# Patient Record
Sex: MALE | Race: WHITE | NOT HISPANIC OR LATINO | Employment: STUDENT | URBAN - METROPOLITAN AREA
[De-identification: names, ages, dates, MRNs, and addresses within clinical notes are randomized per-mention and may not be internally consistent; named-entity substitution may affect disease eponyms.]

---

## 2023-11-24 ENCOUNTER — OFFICE VISIT (OUTPATIENT)
Dept: URGENT CARE | Facility: CLINIC | Age: 23
End: 2023-11-24
Payer: COMMERCIAL

## 2023-11-24 VITALS
SYSTOLIC BLOOD PRESSURE: 152 MMHG | HEART RATE: 58 BPM | OXYGEN SATURATION: 98 % | BODY MASS INDEX: 28 KG/M2 | WEIGHT: 200 LBS | RESPIRATION RATE: 14 BRPM | DIASTOLIC BLOOD PRESSURE: 97 MMHG | HEIGHT: 71 IN | TEMPERATURE: 97.9 F

## 2023-11-24 DIAGNOSIS — H69.91 DISORDER OF RIGHT EUSTACHIAN TUBE: Primary | ICD-10-CM

## 2023-11-24 PROCEDURE — 99203 OFFICE O/P NEW LOW 30 MIN: CPT | Performed by: FAMILY MEDICINE

## 2023-11-24 NOTE — PROGRESS NOTES
[facility]        NAME: Valentino Goldstein is a 26 y.o. male  : 2000    MRN: [id number]  DATE: 2023  TIME: 2:06 PM    Assessment and Plan   Disorder of right eustachian tube [H69.91]  1. Disorder of right eustachian tube              Patient Instructions     Afrin twice a day for 2 to 3 days, then stop. Eustachian tube/ear massage as demonstrated, 2 to 3 minutes every 2-3 hours. Follow up with PCP in 3-5 days. Proceed to  ER if symptoms worsen. Chief Complaint     Chief Complaint   Patient presents with    Ear Fullness     Right ear blockage and fullness starting yesterday         History of Present Illness       Ear Fullness (Right ear blockage and fullness starting yesterday). He has tried some type of eardrops. Decongestants, and saline nasal spray, no relief. Ear Fullness         Review of Systems   Review of Systems   Constitutional: Negative. HENT:  Positive for ear pain. Respiratory: Negative. Cardiovascular: Negative. Current Medications     No current outpatient medications on file. Current Allergies     Allergies as of 2023 - Reviewed 2023   Allergen Reaction Noted    Amoxicillin Rash 2005            The following portions of the patient's history were reviewed and updated as appropriate: allergies, current medications, past family history, past medical history, past social history, past surgical history and problem list.     History reviewed. No pertinent past medical history. Past Surgical History:   Procedure Laterality Date    TYMPANOSTOMY TUBE PLACEMENT         Family History   Problem Relation Age of Onset    No Known Problems Mother     No Known Problems Father          Medications have been verified. Objective   /97   Pulse 58   Temp 97.9 °F (36.6 °C)   Resp 14   Ht 5' 11" (1.803 m)   Wt 90.7 kg (200 lb)   SpO2 98%   BMI 27.89 kg/m²   No LMP for male patient.        Physical Exam     Physical Exam  HENT:      Right Ear: Tympanic membrane is retracted.       Left Ear: Tympanic membrane normal.

## 2023-11-24 NOTE — PATIENT INSTRUCTIONS
Afrin twice a day for 2 to 3 days, then stop. Eustachian tube/ear massage as demonstrated, 2 to 3 minutes every 2-3 hours. Follow up with PCP in 3-5 days. Proceed to  ER if symptoms worsen. Eustachian Tube Dysfunction   WHAT YOU NEED TO KNOW:   What is eustachian tube dysfunction (ETD)? ETD is a condition that prevents your eustachian tubes from opening properly. It can also cause them to become blocked. Eustachian tubes connect your middle ear to the back of your nose and throat. These tubes open and allow air to flow in and out when you sneeze, swallow, or yawn. What causes or increases my risk for ETD? ETD may be caused by swelling or buildup of mucus in your eustachian tubes. Pressure can build if you travel in an airplane or go scuba diving. Allergies, a cold, or a sinus infection can cause mucus to build up. The following can also increase your risk:  Smoking cigarettes    GERD, chronic sinus inflammation, or a tumor in your nose or throat    An immune system disorder    Sleeping on your stomach    In children, long-term use of a bottle, going to , or a condition such as a cleft palate    What are the signs and symptoms of ETD? Fullness or pressure in your ears    Muffled hearing, or a feeling you are hearing under water or have clogged ears    Pain in one or both ears    Ringing in your ears    Popping, crackling, or clicking feeling in your ears    Trouble keeping your balance    How is ETD diagnosed? ETD is most common in children younger than 5 years. Adults with ETD may have had it since childhood. ETD can sometimes begin in adulthood, usually because of certain medical conditions that have developed. Your healthcare provider will ask about your symptoms and when they began. He or she will examine your ears, your nose, and the back of your throat. He or she may also do a hearing test.  How is ETD treated? ETD may get better on its own without any treatment.  If it continues, you may need any of the following:  Swallow, yawn, or chew gum  to help open your eustachian tubes. Your healthcare provider may also recommend you blow with your mouth shut and your nostrils pinched closed. Air pressure devices  push air into your nose and eustachian tubes to help relieve air pressure in your ear. Treatment for allergies  such as decongestants, antihistamines, and nasal steroids may improve ETD. They may help decrease swelling of the eustachian tubes. A myringotomy  is surgery to make a hole in your eardrum. The hole relieves pressure and lets fluid drain from your ear. A pressure equalizing (PE) tube may be used to keep the hole open and to help drain fluid. Tuboplasty  is a procedure to widen your eustachian tubes. When should I call my doctor? Your symptoms do not improve or get worse. You have a fever. You have any hearing loss. You have questions or concerns about your condition or care. CARE AGREEMENT:   You have the right to help plan your care. Learn about your health condition and how it may be treated. Discuss treatment options with your healthcare providers to decide what care you want to receive. You always have the right to refuse treatment. The above information is an  only. It is not intended as medical advice for individual conditions or treatments. Talk to your doctor, nurse or pharmacist before following any medical regimen to see if it is safe and effective for you. © Copyright [employer] 2023 Information is for End User's use only and may not be sold, redistributed or otherwise used for commercial purposes.